# Patient Record
Sex: FEMALE | URBAN - METROPOLITAN AREA
[De-identification: names, ages, dates, MRNs, and addresses within clinical notes are randomized per-mention and may not be internally consistent; named-entity substitution may affect disease eponyms.]

---

## 2017-03-17 ENCOUNTER — PRE VISIT (OUTPATIENT)
Dept: CARDIOLOGY | Facility: CLINIC | Age: 27
End: 2017-03-17

## 2017-03-17 DIAGNOSIS — R00.2 PALPITATIONS: Primary | ICD-10-CM

## 2017-03-18 ASSESSMENT — ENCOUNTER SYMPTOMS
DYSPNEA ON EXERTION: 1
COUGH DISTURBING SLEEP: 0
VOMITING: 1
BOWEL INCONTINENCE: 0
PARALYSIS: 0
EXERCISE INTOLERANCE: 1
HOT FLASHES: 0
SMELL DISTURBANCE: 0
FATIGUE: 1
LIGHT-HEADEDNESS: 1
ABDOMINAL PAIN: 1
DECREASED APPETITE: 1
DEPRESSION: 1
RECTAL BLEEDING: 0
NIGHT SWEATS: 0
CLAUDICATION: 1
TASTE DISTURBANCE: 0
BACK PAIN: 1
HEADACHES: 1
HALLUCINATIONS: 0
DECREASED CONCENTRATION: 1
HYPERTENSION: 0
NAUSEA: 1
DOUBLE VISION: 0
COUGH: 0
WHEEZING: 0
NECK MASS: 0
WEIGHT LOSS: 0
CONSTIPATION: 1
PANIC: 1
SEIZURES: 0
CHILLS: 1
LEG PAIN: 0
SLEEP DISTURBANCES DUE TO BREATHING: 0
SPEECH CHANGE: 1
RECTAL PAIN: 0
NECK PAIN: 1
TACHYCARDIA: 0
DIARRHEA: 1
BLOATING: 1
POLYDIPSIA: 0
TROUBLE SWALLOWING: 1
FEVER: 1
MUSCLE CRAMPS: 1
SINUS PAIN: 1
SINUS CONGESTION: 1
JOINT SWELLING: 1
EYE WATERING: 0
JAUNDICE: 0
POSTURAL DYSPNEA: 1
EYE REDNESS: 0
EYE PAIN: 0
INSOMNIA: 1
SNORES LOUDLY: 0
BLOOD IN STOOL: 0
DISTURBANCES IN COORDINATION: 1
NERVOUS/ANXIOUS: 1
LOSS OF CONSCIOUSNESS: 0
MUSCLE WEAKNESS: 1
MYALGIAS: 1
RESPIRATORY PAIN: 0
POLYPHAGIA: 1
SYNCOPE: 1
SHORTNESS OF BREATH: 0
INCREASED ENERGY: 1
WEIGHT GAIN: 1
SPUTUM PRODUCTION: 1
DECREASED LIBIDO: 1
SKIN CHANGES: 0
EYE IRRITATION: 0
HOARSE VOICE: 1
SORE THROAT: 1
HYPOTENSION: 0
HEMOPTYSIS: 0
POOR WOUND HEALING: 0
ALTERED TEMPERATURE REGULATION: 1
DIZZINESS: 1
MEMORY LOSS: 1
TINGLING: 1
NUMBNESS: 0
WEAKNESS: 1
LEG SWELLING: 1
NAIL CHANGES: 0
TREMORS: 1
STIFFNESS: 1
PALPITATIONS: 0
HEARTBURN: 0
ORTHOPNEA: 1
ARTHRALGIAS: 1

## 2017-03-21 ENCOUNTER — OFFICE VISIT (OUTPATIENT)
Dept: CARDIOLOGY | Facility: CLINIC | Age: 27
End: 2017-03-21
Attending: INTERNAL MEDICINE
Payer: COMMERCIAL

## 2017-03-21 VITALS
HEIGHT: 63 IN | HEART RATE: 87 BPM | SYSTOLIC BLOOD PRESSURE: 131 MMHG | BODY MASS INDEX: 30.11 KG/M2 | OXYGEN SATURATION: 99 % | DIASTOLIC BLOOD PRESSURE: 80 MMHG | WEIGHT: 169.9 LBS

## 2017-03-21 DIAGNOSIS — R55 SYNCOPE, UNSPECIFIED SYNCOPE TYPE: Primary | ICD-10-CM

## 2017-03-21 DIAGNOSIS — R00.2 PALPITATIONS: ICD-10-CM

## 2017-03-21 PROCEDURE — 99212 OFFICE O/P EST SF 10 MIN: CPT

## 2017-03-21 PROCEDURE — 99204 OFFICE O/P NEW MOD 45 MIN: CPT | Mod: ZP | Performed by: INTERNAL MEDICINE

## 2017-03-21 PROCEDURE — 93010 ELECTROCARDIOGRAM REPORT: CPT | Mod: ZP | Performed by: INTERNAL MEDICINE

## 2017-03-21 PROCEDURE — 93005 ELECTROCARDIOGRAM TRACING: CPT | Mod: ZF

## 2017-03-21 RX ORDER — DESOGESTREL AND ETHINYL ESTRADIOL 21-5 (28)
KIT ORAL
COMMUNITY

## 2017-03-21 ASSESSMENT — PAIN SCALES - GENERAL: PAINLEVEL: NO PAIN (0)

## 2017-03-21 NOTE — MR AVS SNAPSHOT
After Visit Summary   3/21/2017    Bernie Lester    MRN: [id number]           Patient Information     Date Of Birth          1990        Visit Information        Provider Department      3/21/2017 3:30 PM Jorge Douglas MD [facility]        Today's Diagnoses     Syncope, unspecified syncope type    -  1    Palpitations           Follow-ups after your visit        Follow-up notes from your care team     Return in about 3 months (around 6/21/2017) for Adkission, Routine Visit.      Your next 10 appointments already scheduled     Jul 03, 2017 11:30 AM CDT   (Arrive by 11:15 AM)   POSTURAL ORTHOSTATIC SYCOPE with Jorge Douglas MD   [facility] ([facility])    [address] [place] [id number] [phone]              Future tests that were ordered for you today     Open Future Orders        Priority Expected Expires Ordered    EP study tilt table Routine  3/21/2018 3/21/2017            Who to contact     If you have questions or need follow up information about today's clinic visit or your schedule please contact [facility] directly at [phone].  Normal or non-critical lab and imaging results will be communicated to you by Noknokerhart, letter or phone within 4 business days after the clinic has received the results. If you do not hear from us within 7 days, please contact the clinic through Noknokerhart or phone. If you have a critical or abnormal lab result, we will notify you by phone as soon as possible.  Submit refill requests through SEA or call your pharmacy and they will forward the refill request to us. Please allow 3 business days for your refill to be completed.          Additional Information About Your Visit        MyChart Information     SEA gives you secure access to your electronic health record. If you see a primary care provider, you can also send messages to your care team and make  "appointments. If you have questions, please call your primary care clinic.  If you do not have a primary care provider, please call [phone] and they will assist you.        Care EveryWhere ID     This is your Care EveryWhere ID. This could be used by other organizations to access your [place] medical records  [id number]        Your Vitals Were     Pulse Height Pulse Oximetry BMI (Body Mass Index)          87 1.6 m (5' 3\") 99% 30.1 kg/m2         Blood Pressure from Last 3 Encounters:   03/21/17 131/80    Weight from Last 3 Encounters:   03/21/17 77.1 kg (169 lb 14.4 oz)              We Performed the Following     EKG 12-lead, tracing only (Future)        Primary Care Provider    No Pcp Confirmed       No address on file        Thank you!     Thank you for choosing [facility]  for your care. Our goal is always to provide you with excellent care. Hearing back from our patients is one way we can continue to improve our services. Please take a few minutes to complete the written survey that you may receive in the mail after your visit with us. Thank you!             Your Updated Medication List - Protect others around you: Learn how to safely use, store and throw away your medicines at www.disposemymeds.org.          This list is accurate as of: 3/21/17  4:55 PM.  Always use your most recent med list.                   Brand Name Dispense Instructions for use    AZURETTE 0.15-0.02/0.01 MG (21/5) per tablet   Generic drug:  desogestrel-ethinyl estradiol            "

## 2017-03-21 NOTE — PATIENT INSTRUCTIONS
1. You were seen by Dr. Douglas today. You will be receiving a call from our clinic with in the next few days to see if your insurance covers your Tilt Table Study.     2. Please follow up in 3 months as planned, with Dr. Douglas.    3. No changes to your medications.    You will receive all normal lab and testing results via GrownOuthart or mail if not reviewed in clinic today. Please contact our office if you need assistance with setting up MyChart.    If you need a medication refill please contact your pharmacy. Please allow 3 business days for your refill to be completed.     As always, thank you for trusting us with your health care needs!    If you have any questions regarding your visit please contact your care team:   Cardiology   Telephone Number     Ruth Richardson RN  Electrophysiology Nurse Coordinator  [phone]   Call for EP procedure scheduling concerns  Leda Breen   EP   [phone]         Device Clinic (Pacemakers and ICD's)   RN's : Taylor Brooks Connie, Dawn  During business hours: [phone]    After business hours:   [phone]- select option 4 and ask for job code [id number].

## 2017-03-21 NOTE — NURSING NOTE
Chief Complaint   Patient presents with     New Patient     EKG; orthostatic BP;   2nd opinion POTS - all records to be faxed to clinic

## 2017-03-21 NOTE — NURSING NOTE
Cardiac Testing: Patient given instructions regarding tilt table study. Discussed purpose, preparation, procedure and when to expect results reported back to the patient. Patient demonstrated understanding of this information and agreed to call with further questions or concerns.    Med Reconcile: Reviewed and verified all current medications with the patient. The updated medication list was printed and given to the patient.    Return Appointment: Patient given instructions regarding scheduling next clinic visit, 3 month f/u. Patient demonstrated understanding of this information and agreed to call with further questions or concerns.    Patient stated she understood all health information given and agreed to call with further questions or concerns.    Britt Solano RN

## 2017-03-23 LAB — INTERPRETATION ECG - MUSE: NORMAL

## 2017-03-27 NOTE — PROGRESS NOTES
Clinical Cardiac Electrophysiology    Chief Complaint: postural tachycardia syndrome (POTS)     HPI: Ms. Lester has a variety of complaints (see scanned list) that have bothered her for many years. She has never gotten satisfactory answers as to what is wrong with her. She was seen an evaluated at [facility] but did not feel she was getting any better and that she was incorrectly labelled as being noncompliant. Her autonomic evaluation there was normal. Her head-up tilt table study did not demonstrate postural tachycardia syndrome (POTS).    She has daily dizziness. She often has to go back to bed after her bath. She cannot shower. After 16 minutes of exercise on a recumbent bike she has to lie down for 30 minutes.     She is free with her salt and drinks two liters of water a day. She was given propanolol in the past that worsened her light-headedness.    She has frequent near syncope. Palpitations bother her less than the near syncopal, dizzy, feeling. She does not describe vertigo.    Current Outpatient Prescriptions   Medication Sig Dispense Refill     desogestrel-ethinyl estradiol (AZURETTE) 0.15-0.02/0.01 MG (21/5) per tablet          Past medical and surgical history is negative.    Social History   Substance Use Topics     Smoking status: Not on file     Smokeless tobacco: Not on file     Alcohol use Not on file       Allergies   Allergen Reactions     Azithromycin Hives     Bactrim Hives     Cephalexin Hives     Amoxicillin Rash         ROS: Please scanned patient list as well. 21Mar2017/woa  Answers for HPI/ROS submitted by the patient on 3/18/2017   General Symptoms: Yes  Skin Symptoms: Yes  HENT Symptoms: Yes  EYE SYMPTOMS: Yes  HEART SYMPTOMS: Yes  LUNG SYMPTOMS: Yes  INTESTINAL SYMPTOMS: Yes  URINARY SYMPTOMS: No  GYNECOLOGIC SYMPTOMS: Yes  BREAST SYMPTOMS: No  SKELETAL SYMPTOMS: Yes  BLOOD SYMPTOMS: No  NERVOUS SYSTEM SYMPTOMS: Yes  MENTAL HEALTH SYMPTOMS: Yes  Fever: Yes  Loss of appetite:  Yes  Weight loss: No  Weight gain: Yes  Fatigue: Yes  Night sweats: No  Chills: Yes  Increased stress: Yes  Excessive hunger: Yes  Excessive thirst: No  Feeling hot or cold when others believe the temperature is normal: Yes  Loss of height: No  Post-operative complications: No  Surgical site pain: No  Hallucinations: No  Change in or Loss of Energy: Yes  Hyperactivity: No  Confusion: No  Changes in hair: No  Changes in moles/birth marks: No  Itching: Yes  Rashes: Yes  Changes in nails: No  Acne: Yes  Hair in places you don't want it: No  Change in facial hair: No  Warts: No  Non-healing sores: No  Scarring: No  Flaking of skin: No  Color changes of hands/feet in cold : No  Sun sensitivity: No  Skin thickening: No  Ear pain: Yes  Ear discharge: No  Hearing loss: Yes  Tinnitus: No  Nosebleeds: No  Congestion: Yes  Sinus pain: Yes  Trouble swallowing: Yes   Voice hoarseness: Yes  Mouth sores: No  Sore throat: Yes  Tooth pain: No  Gum tenderness: No  Bleeding gums: No  Change in taste: No  Change in sense of smell: No  Dry mouth: No  Hearing aid used: No  Neck lump: No  Eye pain: No  Vision loss: Yes  Dry eyes: No  Watery eyes: No  Eye bulging: No  Double vision: No  Flashing of lights: No  Spots: Yes  Floaters: Yes  Redness: No  Crossed eyes: No  Tunnel Vision: No  Yellowing of eyes: No  Eye irritation: No  Cough: No  Sputum or phlegm: Yes  Coughing up blood: No  Difficulty breating or shortness of breath: No  Snoring: No  Wheezing: No  Difficulty breathing on exertion: Yes  Respiratory pain: No  Nighttime Cough: No  Difficulty breathing when lying flat: Yes  Chest pain or pressure: No  Fast or irregular heartbeat: No  Pain in legs with walking: No  Swelling in feet or ankles: Yes  Trouble breathing while lying down: Yes  Fingers or Toes appear blue: No  High blood pressure: No  Low blood pressure: No  Fainting: Yes  Murmurs: No  Chest pain on exertion: Yes  Chest pain at rest: No  Cramping pain in leg during exercise:  "Yes  Pacemaker: No  Varicose veins: No  Edema or swelling: No  Fast heart beat: No  Wake up at night with shortness of breath: No  Heart flutters: No  Light-headedness: Yes  Exercise intolerance: Yes  Heart burn or indigestion: No  Nausea: Yes  Vomiting: Yes  Abdominal pain: Yes  Bloating: Yes  Constipation: Yes  Diarrhea: Yes  Blood in stool: No  Black stools: No  Rectal or Anal pain: No  Fecal incontinence: No  Rectal bleeding: No  Yellowing of skin or eyes: No  Vomit with blood: No  Change in stools: No  Hemorrhoids: No  Back pain: Yes  Muscle aches: Yes  Neck pain: Yes  Swollen joints: Yes  Joint pain: Yes  Bone pain: Yes  Muscle cramps: Yes  Muscle weakness: Yes  Joint stiffness: Yes  Bone fracture: No  Trouble with coordination: Yes  Dizziness or trouble with balance: Yes  Fainting or black-out spells: No  Memory loss: Yes  Headache: Yes  Seizures: No  Speech problems: Yes  Tingling: Yes  Tremor: Yes  Weakness: Yes  Difficulty walking: No  Paralysis: No  Numbness: No  Bleeding or spotting between periods: No  Heavy or painful periods: Yes  Irregular periods: No  Vaginal discharge: No  Hot flashes: No  Vaginal dryness: No  Genital ulcers: No  Reduced libido: Yes  Painful intercourse: Yes  Difficulty with sexual arousal: Yes  Post-menopausal bleeding: No  Nervous or Anxious: Yes  Depression: Yes  Trouble sleeping: Yes  Trouble thinking or concentrating: Yes  Mood changes: Yes  Panic attacks: Yes    21Mar2017/woa    Physical Examination:  Vitals: /80  Pulse 87  Ht 1.6 m (5' 3\")  Wt 77.1 kg (169 lb 14.4 oz)  SpO2 99%  BMI 30.1 kg/m2  BMI= Body mass index is 30.1 kg/(m^2).    Five minute standing test (see extended vitals) showed normal heart rate increase with standing associated with dizziness. Her heart rate did not increased more than 30 bpm and did not exceed 120 bpm.    GENERAL APPEARANCE: healthy, alert and no distress  HEENT: no icterus, no xanthelasmas, normal pupil size and reaction, normal " "palate, mucosa moist, no cyanosis.  NECK: no adenopathy, no asymmetry, masses, or scars, thyroid normal to palpation, carotid upstrokes are normal bilaterally, no cervical bruits, JVP is not visible  CHEST: lungs clear to auscultation - no rales, rhonchi or wheezes, no use of accessory muscles, no retractions, respirations are unlabored, normal respiratory rate, no kyphosis, no scoliosis  CARDIOVASCULAR: regular rhythm, normal S1 with physiologic split S2, no S3 or S4 and no murmur, click or rub, precordium quiet with normal PMI.  ABDOMEN: soft, non tender, without hepatosplenomegaly, no masses palpable, bowel sounds normal, aorta not enlarged by palpation, no abdominal bruits  EXTREMITIES: no clubbing, cyanosis or edema  NEURO: alert and oriented to person/place/time, normal speech, gait and affect  VASC: Radial, femoral, dorsalis pedis and posterior tibialis pulses are normal in volumes and symmetric bilaterally. No vascular bruits heard.  SKIN: no ecchymoses, no rashes    Laboratory:  Old records reviewed in EPIC and noted in HPI.     Head-up tilt table study with autonomic testing ordered.      Assessment and recommendations:    1. Palpitations  2. Syncope, unspecified syncope type    We discussed that her active standing test did not demonstrate postural tachycardia syndrome. She was \"dizzy\" at a time when her heart rate and BP were entirely normal.     It is unlikely that a single unifying diagnosis with be found. She does have symptoms of autonomic dysfunction but her autonomic testing at [facility] was normal.     We discussed there is a great deal of overlap in autonomic syndromes and that treatment is often similar for all of them. Namely:    - adequate hydration   - she will measure 3-4 tsps of salt and consume it daily   - minimum of 2 liters of water daily    - exercise - recumbent at first, or swimming. This has been shown to be effective but is slow and requires a great deal of time    She will re-focus her " efforts on the above.    We also discussed midodrine.     I appreciate the chance to help with Ms. Lester's care. Please let me know if you have any questions or concerns.    Jorge Douglas MD

## 2017-04-12 ENCOUNTER — APPOINTMENT (OUTPATIENT)
Dept: CARDIOLOGY | Facility: CLINIC | Age: 27
End: 2017-04-12
Attending: INTERNAL MEDICINE
Payer: COMMERCIAL

## 2017-04-12 ENCOUNTER — APPOINTMENT (OUTPATIENT)
Dept: MEDSURG UNIT | Facility: CLINIC | Age: 27
End: 2017-04-12
Attending: INTERNAL MEDICINE
Payer: COMMERCIAL

## 2017-04-12 ENCOUNTER — HOSPITAL ENCOUNTER (OUTPATIENT)
Facility: CLINIC | Age: 27
Discharge: HOME | End: 2017-04-12
Attending: INTERNAL MEDICINE | Admitting: INTERNAL MEDICINE
Payer: COMMERCIAL

## 2017-04-12 VITALS
SYSTOLIC BLOOD PRESSURE: 127 MMHG | HEART RATE: 96 BPM | OXYGEN SATURATION: 100 % | BODY MASS INDEX: 30.11 KG/M2 | DIASTOLIC BLOOD PRESSURE: 79 MMHG | TEMPERATURE: 98.1 F | HEIGHT: 63 IN | RESPIRATION RATE: 18 BRPM | WEIGHT: 169.9 LBS

## 2017-04-12 DIAGNOSIS — R55 SYNCOPE, UNSPECIFIED SYNCOPE TYPE: ICD-10-CM

## 2017-04-12 DIAGNOSIS — R00.2 PALPITATIONS: ICD-10-CM

## 2017-04-12 PROCEDURE — 25000128 H RX IP 250 OP 636: Performed by: INTERNAL MEDICINE

## 2017-04-12 PROCEDURE — 4A00X2Z MEASUREMENT OF CENTRAL NERVOUS CONDUCTIVITY, EXTERNAL APPROACH: ICD-10-PCS | Performed by: INTERNAL MEDICINE

## 2017-04-12 PROCEDURE — 4A03XB1 MEASUREMENT OF ARTERIAL PRESSURE, PERIPHERAL, EXTERNAL APPROACH: ICD-10-PCS | Performed by: INTERNAL MEDICINE

## 2017-04-12 PROCEDURE — 40000065 ZZH STATISTIC EKG NON-CHARGEABLE

## 2017-04-12 PROCEDURE — 93660 TILT TABLE EVALUATION: CPT

## 2017-04-12 PROCEDURE — 93010 ELECTROCARDIOGRAM REPORT: CPT | Performed by: INTERNAL MEDICINE

## 2017-04-12 PROCEDURE — 40000166 ZZH STATISTIC PP CARE STAGE 1

## 2017-04-12 PROCEDURE — 95921 AUTONOMIC NRV PARASYM INERVJ: CPT

## 2017-04-12 PROCEDURE — 93660 TILT TABLE EVALUATION: CPT | Mod: 26 | Performed by: INTERNAL MEDICINE

## 2017-04-12 PROCEDURE — 95921 AUTONOMIC NRV PARASYM INERVJ: CPT | Mod: 26 | Performed by: INTERNAL MEDICINE

## 2017-04-12 PROCEDURE — 93005 ELECTROCARDIOGRAM TRACING: CPT

## 2017-04-12 PROCEDURE — 4A02XFZ MEASUREMENT OF CARDIAC RHYTHM, EXTERNAL APPROACH: ICD-10-PCS | Performed by: INTERNAL MEDICINE

## 2017-04-12 RX ORDER — LIDOCAINE 40 MG/G
CREAM TOPICAL
Status: DISCONTINUED | OUTPATIENT
Start: 2017-04-12 | End: 2017-04-12 | Stop reason: HOSPADM

## 2017-04-12 RX ORDER — SODIUM CHLORIDE 9 MG/ML
INJECTION, SOLUTION INTRAVENOUS CONTINUOUS
Status: DISCONTINUED | OUTPATIENT
Start: 2017-04-12 | End: 2017-04-12 | Stop reason: HOSPADM

## 2017-04-12 RX ADMIN — SODIUM CHLORIDE 500 ML: 9 INJECTION, SOLUTION INTRAVENOUS at 10:55

## 2017-04-12 NOTE — PROCEDURES
Procedures:  1. TILT table test.  2. Autonomic function test.    Cardiologist: Jorge Douglas MD  Pre-operative Diagnosis:  Syncope, postural light-headedness/palpitations.  Complications: None.     Medications:  None.       Clinical Profile:  27 year old with prior diagnosis of postural tachycardia syndrome (POTS) and significant postural intolerance. She has rarer episodes of loss of consciousness without typical postural symptoms.     PROCEDURE  The risks and benefits of the procedure were explained to the patient in full.  Written informed consent was obtained. The patient was brought to the EP lab in a fasting and hemodynamically stable condition. Physical examination of the patient did not reveal any carotid bruits, and review of the chart did not reveal the presence of stroke or TIA within the past three months.   The following maneuvers were done sequentially:  1- Supine for 5 minutes: /85 mmHg, HR 89 bpm.     2- Maneuvers in seated position:     A.  Cough: Manuel /82 mmHg,  bpm, Recovery approx 5 sec, No symptoms.     C. Valsalva: Manuel /95 mmHg, HR 78 bpm, all four phases present with normal overshoot to 153/87 with appropriate drop in heart rate, No symptoms.       This was consistent with physiologic response.  3- Supine rest for 5 minutes: No significant change in hemodynamic profile.  4- TILT at 70 degrees for 20 minutes: /72 mmHg, HR 99 bpm. No significant change in hemodynamic profile. No symptoms.  During tilt normal oscillations in heart rate and BP noted. On occasion heart rate was greater than 120 bpm.  DISCUSSION:  Her heart rate did increase to > 120 bpm but this was brief and not associated with her clinical symptoms. In addition, it occur after first 5 minute of tilt. This is not the typical response seen with postural tachycardia syndrome (POTS).  She has been exercising (supine and seated) and is focused on good hydration. She has been feeling better.  She  should continue those efforts.   She has follow-up scheduled with me.

## 2017-04-12 NOTE — PROGRESS NOTES
Pt received from Cath Lab with RN. Pt awake and alert, denies pain or discomfort. Tolerating fluids. Up walking, steady on her feet; denies any dizziness. Cath Lab RN reports pt spoke with Dr Hernandez and has follow up appointment set up. Pt discharged to home per self. No sedation received during procedure.

## 2017-04-13 LAB — INTERPRETATION ECG - MUSE: NORMAL

## 2017-07-06 ASSESSMENT — ENCOUNTER SYMPTOMS
DEPRESSION: 1
DIZZINESS: 1
FEVER: 0
INSOMNIA: 0
CLAUDICATION: 0
ALTERED TEMPERATURE REGULATION: 1
SEIZURES: 0
BOWEL INCONTINENCE: 0
HEADACHES: 1
WEAKNESS: 1
EXERCISE INTOLERANCE: 1
EYE WATERING: 0
PARALYSIS: 0
NAUSEA: 1
DISTURBANCES IN COORDINATION: 1
EYE IRRITATION: 0
HYPOTENSION: 0
DECREASED APPETITE: 0
JOINT SWELLING: 0
DOUBLE VISION: 0
RECTAL BLEEDING: 0
DECREASED CONCENTRATION: 1
WEIGHT LOSS: 0
NECK MASS: 0
PALPITATIONS: 0
CHILLS: 1
MUSCLE CRAMPS: 1
DIARRHEA: 1
FATIGUE: 1
LEG PAIN: 0
POLYPHAGIA: 0
PANIC: 0
TROUBLE SWALLOWING: 0
SORE THROAT: 1
TINGLING: 0
BACK PAIN: 1
TASTE DISTURBANCE: 0
LOSS OF CONSCIOUSNESS: 1
MEMORY LOSS: 1
JAUNDICE: 0
INCREASED ENERGY: 0
SMELL DISTURBANCE: 0
RECTAL PAIN: 0
HYPERTENSION: 0
LIGHT-HEADEDNESS: 1
ABDOMINAL PAIN: 1
EYE REDNESS: 0
NUMBNESS: 0
STIFFNESS: 1
BLOATING: 1
ARTHRALGIAS: 1
CONSTIPATION: 1
HEARTBURN: 0
POLYDIPSIA: 0
NIGHT SWEATS: 0
SPEECH CHANGE: 0
LEG SWELLING: 0
BLOOD IN STOOL: 0
HALLUCINATIONS: 0
ORTHOPNEA: 0
MYALGIAS: 1
SINUS CONGESTION: 1
SLEEP DISTURBANCES DUE TO BREATHING: 0
SINUS PAIN: 1
TACHYCARDIA: 0
HOARSE VOICE: 1
MUSCLE WEAKNESS: 1
WEIGHT GAIN: 0
TREMORS: 0
SYNCOPE: 1
NECK PAIN: 1
VOMITING: 0
NERVOUS/ANXIOUS: 1
EYE PAIN: 0

## 2017-07-17 ENCOUNTER — PRE VISIT (OUTPATIENT)
Dept: CARDIOLOGY | Facility: CLINIC | Age: 27
End: 2017-07-17

## 2017-07-17 ENCOUNTER — OFFICE VISIT (OUTPATIENT)
Dept: CARDIOLOGY | Facility: CLINIC | Age: 27
End: 2017-07-17
Attending: INTERNAL MEDICINE
Payer: COMMERCIAL

## 2017-07-17 VITALS
WEIGHT: 169 LBS | SYSTOLIC BLOOD PRESSURE: 134 MMHG | HEIGHT: 63 IN | OXYGEN SATURATION: 98 % | DIASTOLIC BLOOD PRESSURE: 84 MMHG | BODY MASS INDEX: 29.95 KG/M2 | HEART RATE: 107 BPM

## 2017-07-17 DIAGNOSIS — R55 SYNCOPE, UNSPECIFIED SYNCOPE TYPE: ICD-10-CM

## 2017-07-17 DIAGNOSIS — R00.2 PALPITATIONS: Primary | ICD-10-CM

## 2017-07-17 PROCEDURE — 99214 OFFICE O/P EST MOD 30 MIN: CPT | Mod: GC | Performed by: INTERNAL MEDICINE

## 2017-07-17 PROCEDURE — 99212 OFFICE O/P EST SF 10 MIN: CPT | Mod: ZF

## 2017-07-17 ASSESSMENT — PAIN SCALES - GENERAL: PAINLEVEL: NO PAIN (0)

## 2017-07-17 NOTE — MR AVS SNAPSHOT
After Visit Summary   7/17/2017    Bernie Lester    MRN: [id number]           Patient Information     Date Of Birth          1990        Visit Information        Provider Department      7/17/2017 1:00 PM Jorge Douglas MD [facility]         Follow-ups after your visit        Follow-up notes from your care team     Return in about 4 months (around 11/17/2017) for Adkission, Routine Visit.      Your next 10 appointments already scheduled     Nov 20, 2017 11:30 AM CST   (Arrive by 11:15 AM)   POSTURAL ORTHOSTATIC SYCOPE with Jorge Douglas MD   [facility] ([facility] [place])    [address] [facility] [address] [phone]              Who to contact     If you have questions or need follow up information about today's clinic visit or your schedule please contact [facility] directly at [phone].  Normal or non-critical lab and imaging results will be communicated to you by TowerJazzhart, letter or phone within 4 business days after the clinic has received the results. If you do not hear from us within 7 days, please contact the clinic through Run3Dt or phone. If you have a critical or abnormal lab result, we will notify you by phone as soon as possible.  Submit refill requests through [employer] or call your pharmacy and they will forward the refill request to us. Please allow 3 business days for your refill to be completed.          Additional Information About Your Visit        MyChart Information     [employer] gives you secure access to your electronic health record. If you see a primary care provider, you can also send messages to your care team and make appointments. If you have questions, please call your primary care clinic.  If you do not have a primary care provider, please call [phone] and they will assist you.        Care EveryWhere ID     This is your Care EveryWhere ID. This could be used by other  "organizations to access your [place] medical records  [id number]        Your Vitals Were     Pulse Height Pulse Oximetry BMI (Body Mass Index)          107 1.6 m (5' 3\") 98% 29.94 kg/m2         Blood Pressure from Last 3 Encounters:   07/17/17 134/84   04/12/17 127/79   03/21/17 131/80    Weight from Last 3 Encounters:   07/17/17 76.7 kg (169 lb)   04/12/17 77.1 kg (169 lb 14.4 oz)   03/21/17 77.1 kg (169 lb 14.4 oz)              Today, you had the following     No orders found for display       Primary Care Provider    No Pcp Confirmed       No address on file        Equal Access to Services     ELIECER IVEY : Octavio Oakley, estela licea, sandy aranda, isac moreno . So [facility] [phone].    ATENCIÓN: Si habla español, tiene a kline disposición servicios gratuitos de asistencia lingüística. Llame al [phone].    We comply with applicable federal civil rights laws and [place] laws. We do not discriminate on the basis of race, color, national origin, age, disability sex, sexual orientation or gender identity.            Thank you!     Thank you for choosing [facility]  for your care. Our goal is always to provide you with excellent care. Hearing back from our patients is one way we can continue to improve our services. Please take a few minutes to complete the written survey that you may receive in the mail after your visit with us. Thank you!             Your Updated Medication List - Protect others around you: Learn how to safely use, store and throw away your medicines at www.disposemymeds.org.          This list is accurate as of: 7/17/17  1:34 PM.  Always use your most recent med list.                   Brand Name Dispense Instructions for use Diagnosis    ACETAMINOPHEN PO      Take 650 mg by mouth        AZURETTE 0.15-0.02/0.01 MG (21/5) per tablet   Generic drug:  desogestrel-ethinyl estradiol           IBUPROFEN PO      Take 200 mg by " mouth every 6 hours as needed for moderate pain

## 2017-07-17 NOTE — NURSING NOTE
Med Reconcile: Reviewed and verified all current medications with the patient. The updated medication list was printed and given to the patient.  Return Appointment: Patient given instructions regarding scheduling next clinic visit. Patient demonstrated understanding of this information and agreed to call with further questions or concerns.

## 2017-07-17 NOTE — PROGRESS NOTES
Clinical Cardiac Electrophysiology    HPI:   We saw Ms. Lester for 3 month follow up of palpitations and lightheadedness experienced upon rising or ambulating but without postural tachycardia or hypotension in clinic or during a tilt-table test.    At our initial visit she expressed that she has never gotten satisfactory answers as to why she has the various symptoms that ail her- lightheadedness & palpitations & near-syncope upon standing or ambulating, diffuse chronic pain that sometimes brings her to tears, and bouts of instability resulting in falls but without vertigo.  She had been evaluated at [facility] but had not felt that she was getting any better and she was incorrectly labelled as being noncompliant.  Her autonomic evaluation there was normal.  We repeated a tilt table which demonstrated normal BP and heart rate responses and during which she reported feeling only mildly lightheaded.    She has been, in accordance with our recommendations 3 months ago, consuming a few spoons of salt per day, making sure to keep hydrated, and regularly engaging in recumbent exercises with a stationary bike.  Her symptoms are improved.  She is going to school to learn voice acting and walking her dog regularly.  She has also been seeing a chiropractor twice weekly which she thinks is helpful.      Current Outpatient Prescriptions on File Prior to Visit:  IBUPROFEN PO Take 200 mg by mouth every 6 hours as needed for moderate pain   ACETAMINOPHEN PO Take 650 mg by mouth   desogestrel-ethinyl estradiol (AZURETTE) 0.15-0.02/0.01 MG (21/5) per tablet      No current facility-administered medications on file prior to visit.     Allergies   Allergen Reactions     Azithromycin Hives     Bactrim Hives     Cephalexin Hives     Amoxicillin Rash     A complete review of systems was positive only for that noted above in the HPI.    Physical Examination:  Vitals: /84 (BP Location: Right arm, Patient Position: Chair, Cuff Size:  "Adult Regular)  Pulse 107  Ht 1.6 m (5' 3\")  Wt 76.7 kg (169 lb)  SpO2 98%  BMI 29.94 kg/m2  BMI= Body mass index is 29.94 kg/(m^2).    GENERAL APPEARANCE:  Comfortable appearing female with unlabored breathing.  HEENT:  Anicteric sclerae.  No xanthelasmas.  NECK:  Supple.  JVP is not visible when sitting upright.  CHEST:  Vesicular lung sounds on ascultation.  CARDIOVASCULAR:  regular rhythm, normal S1 with physiologic split S2, no S3 or S4 and no murmur, click or rub.  Warm extremities with no peripheral edema, cyanosis or mottling.  ABDOMEN: soft, not tender or distended.  NEURO: alert and fully oriented, fluent speech.  VASC:  No carotid bruits.  SKIN:  Not jaundiced.  No petechiae/ecchymoses.    Laboratory:  Old records reviewed in [facility] and noted in HPI.     Tilt table test 4/12/17:  The following maneuvers were done sequentially:  1- Supine for 5 minutes: /85 mmHg, HR 89 bpm.      2- Maneuvers in seated position:     A.  Cough: Manuel /82 mmHg,  bpm, Recovery approx 5 sec, No symptoms.     C. Valsalva: Manuel /95 mmHg, HR 78 bpm, all four phases present with normal overshoot to 153/87 with appropriate drop in heart rate, No symptoms.        This was consistent with physiologic response.  3- Supine rest for 5 minutes: No significant change in hemodynamic profile.  4- TILT at 70 degrees for 20 minutes: /72 mmHg, HR 99 bpm. No significant change in hemodynamic profile. No symptoms.  During tilt normal oscillations in heart rate and BP noted. On occasion heart rate was greater than 120 bpm.      Assessment and recommendations:    1. Palpitations  2. Syncope, unspecified syncope type    We discussed that her tilt-table test demonstrated physiologic HR and BP responses.   We encouraged her to continue adequate hydration (3-4 tsps of salt daily & minimum of 2 liters of water daily) and regular recumbent exercise with upright exercise as tolerated.  We again discussed the option of " midodrine as an adjunct symptomatic relief but will continue with the conservative measures above since they are helping.    She is scheduled to follow up in 4 months but she will call and delay the visit for 2 or 3 months further if she is feeling well.    I discussed the patient with Dr. Douglas.    Chong Chaudhary MD  Cardiovascular disease fellow    Attending: Patient seen and examined with Dr. Hilario. The H&P is accurate and any additional findings of my own added to the body of the note.  All labs and imaging studies reviewed personally. The assessment and plans outlined reflect our joint decision making and were reached after careful discussion and review.     Jorge Douglas MD

## 2017-07-17 NOTE — PATIENT INSTRUCTIONS
Cardiology Provider you saw in clinic today: Dr. Douglas    Medication Changes:  none    Labs/Tests needed:  none     Follow-up Visit:  4 months    Further Instructions:      You will receive all normal lab and testing results via [facility] or mail if not reviewed in clinic today. Please contact our office if you need assistance with setting up MyChart.    If you need a medication refill please contact your pharmacy. Please allow 3 business days for your refill to be completed.     As always, thank you for trusting us with your health care needs!    If you have any questions regarding your visit please contact your care team:   Cardiology  Telephone Number    EP RN  Electrophysiology Nurse Coordinator [phone]     Call for EP procedure scheduling concerns  [facility]  [phone]           Device Clinic (Pacemakers, ICDs, Loop)   RN's : Taylor Brooks Connie, Dawn During business hours: [phone]    After business hours:   [phone]- select option 4 and ask for job code [id number].

## 2017-07-17 NOTE — NURSING NOTE
Chief Complaint   Patient presents with     Follow Up For     3 month follow up for syncope and dizziness      Vitals were taken and medications were reconciled. .    DOMINGA Santiago  12:57 PM

## 2018-01-07 ENCOUNTER — HEALTH MAINTENANCE LETTER (OUTPATIENT)
Age: 28
End: 2018-01-07

## 2020-03-10 ENCOUNTER — HEALTH MAINTENANCE LETTER (OUTPATIENT)
Age: 30
End: 2020-03-10

## 2020-12-27 ENCOUNTER — HEALTH MAINTENANCE LETTER (OUTPATIENT)
Age: 30
End: 2020-12-27

## 2021-04-24 ENCOUNTER — HEALTH MAINTENANCE LETTER (OUTPATIENT)
Age: 31
End: 2021-04-24

## 2021-10-09 ENCOUNTER — HEALTH MAINTENANCE LETTER (OUTPATIENT)
Age: 31
End: 2021-10-09

## 2022-05-16 ENCOUNTER — HEALTH MAINTENANCE LETTER (OUTPATIENT)
Age: 32
End: 2022-05-16

## 2022-09-11 ENCOUNTER — HEALTH MAINTENANCE LETTER (OUTPATIENT)
Age: 32
End: 2022-09-11

## 2023-06-03 ENCOUNTER — HEALTH MAINTENANCE LETTER (OUTPATIENT)
Age: 33
End: 2023-06-03

## (undated) RX ORDER — SODIUM CHLORIDE 9 MG/ML
INJECTION, SOLUTION INTRAVENOUS
Status: DISPENSED
Start: 2017-04-12